# Patient Record
(demographics unavailable — no encounter records)

---

## 2024-12-16 NOTE — HISTORY OF PRESENT ILLNESS
[de-identified] : Pt is c/o ear pain since yesterday. No fever. Mom mentioned pt is congested for 4 days. No coughing [FreeTextEntry6] : c/o right ear pain and stomachache no vomiting, no diarrhea normal appetite

## 2024-12-28 NOTE — HISTORY OF PRESENT ILLNESS
[de-identified] : per mom patient has been having cough, congestion, ear pain, and now is complaining of his mouth hurting, and loose stools, afebrile  [FreeTextEntry6] :  + congestion and cough X 2day, + headache, + ST, no ear pain, + n/v X 2, +loose stool X 1, eating and drinking well, normal voiding. afebrile.

## 2024-12-28 NOTE — DISCUSSION/SUMMARY
[FreeTextEntry1] : D/W caregiver viral illness with pharyngitis, rapid strep negative, throat cx sent out, continue supportive care, monitor for dehydration, difficulty swallowing, persistent fever and call if occuring for recheck. If throat cx positive pt may take amoxicillin 250mg/5ml susp 10ml PO BID [id number].   COVID-19 and flu rapid negative today-. Answered patient questions about COVID-19 including signs and symptoms, self home care and proper isolation precautions. time spent: 30min

## 2024-12-28 NOTE — REVIEW OF SYSTEMS
[Fever] : no fever [Nasal Congestion] : nasal congestion [Sore Throat] : sore throat [Cough] : cough [Vomiting] : vomiting [Diarrhea] : diarrhea

## 2025-01-09 NOTE — DISCUSSION/SUMMARY
[FreeTextEntry1] : 3y M seen for acute visit. URI symptoms with right OM. Painful for patient, purulent effusion on exam. Cefdinir QD x 10 days. Increase binding foods, add probiotic. RTO 2 weeks for ear recheck (hx of ETD, chronic rhinitis, frequent ear infections)

## 2025-01-09 NOTE — HISTORY OF PRESENT ILLNESS
[de-identified] : mom reports pt w ear pain and stomachache x 2 days, +UO, appetite OK, mom denies fever, NVD, SOB, wheezing, dc from ears; pt dx strep on 12/29/2024 -finished abx on 01/06/2025 [FreeTextEntry6] : ear pain and diarrhea x 2 days. completed abx for strep on 1/6. Felt better from that illness prior to onset of these symptoms. Afebrile. Drinking well. Normal urination.

## 2025-01-09 NOTE — HISTORY OF PRESENT ILLNESS
[de-identified] : mom reports pt w ear pain and stomachache x 2 days, +UO, appetite OK, mom denies fever, NVD, SOB, wheezing, dc from ears; pt dx strep on 12/29/2024 -finished abx on 01/06/2025 [FreeTextEntry6] : ear pain and diarrhea x 2 days. completed abx for strep on 1/6. Felt better from that illness prior to onset of these symptoms. Afebrile. Drinking well. Normal urination.

## 2025-01-09 NOTE — PHYSICAL EXAM
[Erythema] : erythema [Bulging] : bulging [Purulent Effusion] : purulent effusion [Mucoid Discharge] : mucoid discharge [Inflamed Nasal Mucosa] : inflamed nasal mucosa [NL] : warm, clear [FreeTextEntry4] : congested [de-identified] : 3+ tonsils

## 2025-01-09 NOTE — PHYSICAL EXAM
[Erythema] : erythema [Bulging] : bulging [Purulent Effusion] : purulent effusion [Mucoid Discharge] : mucoid discharge [Inflamed Nasal Mucosa] : inflamed nasal mucosa [NL] : warm, clear [FreeTextEntry4] : congested [de-identified] : 3+ tonsils

## 2025-03-15 NOTE — HISTORY OF PRESENT ILLNESS
[de-identified] : increased dry patches on skin all over body. Mother concerned of possible callus reopening and not healing on right big toe. no fevers. no other c/o. [FreeTextEntry6] : Dry itchy skin, getting worse. Also has cracked skin on toes, wants checked.

## 2025-03-15 NOTE — DISCUSSION/SUMMARY
[FreeTextEntry1] : Recommend daily moisturizer and topical steroid prn for atopic dermatitis outbreaks creating discomfort. Aquaphor should be used liberally. Topical steroids should be used infrequently and should not be used on the face as they may permanently lighten skin color. Limit hot water bathing during acute flairs. It is OK to bathe baby every 2-3 days during such flair ups. Avoid skin products which are perfumed or have coloring or dye. Hypoallergenic preparations are best for eczema.

## 2025-03-15 NOTE — PHYSICAL EXAM
[NL] : no abnormal lymph nodes palpated [de-identified] : Overall dry, scaling and erythema flexor surfaces and nape of neck. Skin on sole of both great toes thick, cracked.

## 2025-05-15 NOTE — PLAN
[TextEntry] : Well appearing 3 yo M with mouth injury s/p fall yesterday Eating and drinking normally Normal neuro exam Ice as tolerated Tylenol PRN pain Let mouth wound heal on its own Cold drinks can help relieve pain and swelling Return PRN

## 2025-05-15 NOTE — PHYSICAL EXAM
[TextEntry] : General: alert and active in no apparent distress, interactive Eyes: conjunctiva clear, EOMI, PERRL Ears: external ears normal Nose: no rhinorrhea, no blood OP: Upper right lip swollen with bite ruddy, bruising and granulation tissue on right upper labial mucosa, no fluid collection palpable, no loose teeth noted Neck: supple, FROM, no TTP Lungs: comfortable WOB Abdomen: soft, nondistended, nontender Skin: No rashes, lesions besides what's noted above MSK: Moving all extremities without pain or swelling Neuro: No focal deficits, gait and speech normal

## 2025-05-15 NOTE — HISTORY OF PRESENT ILLNESS
[de-identified] : floor was being mopped and pt was running and slipped and fell. he hit his chin on the floor and bit his top lip. he has a big cut on the inside of this mouth  [FreeTextEntry6] : In addition to above: Accident happened yesterday but patient's upper lip appears more swollen today Fell on hardwood floor Denies LOC, vomiting No other injuries He is eating and drinking normally His behavior is at baseline Gait and speech at baseline

## 2025-06-20 NOTE — HISTORY OF PRESENT ILLNESS
[Mother] : mother [Normal] : Normal [Brushing teeth] : Brushing teeth [Toothpaste] : Primary Fluoride Source: Toothpaste [In Pre-K] : In Pre-K [Playtime (60 min/d)] : Playtime 60 min a day [< 2 hrs of screen time] : Less than 2 hrs of screen time [No] : Not at  exposure [FreeTextEntry7] : 4 yr [facility].  Patient doing well.  No parental concerns.  [de-identified] : Good appetite, eats a variety of foods.  [FreeTextEntry1] : - Coordination of care form reviewed. - Lead level questionnaire reviewed - no risk for lead exposure. - Discussed 5-2-1-0 questionnaire with parent (and patient, if age appropriate and able to comprehend.)  Concerns and issues addressed if indicated.

## 2025-06-20 NOTE — DISCUSSION/SUMMARY
[School Readiness] : school readiness [Healthy Personal Habits] : healthy personal habits [TV/Media] : tv/media [Child and Family Involvement] : child and family involvement [Safety] : safety [Mother] : mother [] : The components of the vaccine(s) to be administered today are listed in the plan of care. The disease(s) for which the vaccine(s) are intended to prevent and the risks have been discussed with the caretaker.  The risks are also included in the appropriate vaccination information statements which have been provided to the patient's caregiver.  The caregiver has given consent to vaccinate. [FreeTextEntry1] : - Follow up in 1 year for annual physical or sooner PRN.

## 2025-06-20 NOTE — DEVELOPMENTAL MILESTONES
[FreeTextEntry1] : [place] reviewed, meets developmental expectations. Gets ST twice a week, has made big gains per mother